# Patient Record
(demographics unavailable — no encounter records)

---

## 2025-06-26 NOTE — HISTORY OF PRESENT ILLNESS
[Mother] : mother [Fruit] : fruit [Vegetables] : vegetables [Meat] : meat [Eggs] : eggs [Fish] : fish [Normal] : Normal [Yes] : Patient goes to dentist yearly [Tap water] : Primary Fluoride Source: Tap water [Grade ___] : Grade [unfilled] [Adequate performance] : Adequate performance [No] : No cigarette smoke exposure [Water heater temperature set at <120 degrees F] : Water heater temperature set at <120 degrees F [Car seat in back seat] : Car seat in back seat [Carbon Monoxide Detectors] : Carbon monoxide detectors [Smoke Detectors] : Smoke detectors [Supervised outdoor play] : Supervised outdoor play

## 2025-06-26 NOTE — DEVELOPMENTAL MILESTONES
[Starts/continues conversation with peers] : starts/continues conversation with peers [Hops on one foot 3 to 4 times] : hops on one foot 3 to 4 times [Writes first and last name in] : writes first and last name in uppercase or lowercase letters

## 2025-06-27 NOTE — DISCUSSION/SUMMARY
[Normal Growth] : growth [Normal Development] : development [None] : No known medical problems [No Elimination Concerns] : elimination [No Feeding Concerns] : feeding [No Skin Concerns] : skin [Normal Sleep Pattern] : sleep [School Readiness] : school readiness [Mental Health] : mental health [Nutrition and Physical Activity] : nutrition and physical activity [Oral Health] : oral health [Safety] : safety [No Medications] : ~He/She~ is not on any medications [Mother] : mother [Full Activity without restrictions including Physical Education & Athletics] : Full Activity without restrictions including Physical Education & Athletics [FreeTextEntry1] : Continue balanced diet with all food groups. Brush teeth twice a day with toothbrush. Recommend visit to dentist. Help child to maintain consistent daily routines and sleep schedule. School discussed. Ensure home is safe. Teach child about personal safety. Use consistent, positive discipline. Limit screen time to no more than 2 hours per day. Encourage physical activity. Child needs to ride in a belt-positioning booster seat until  4 feet 9 inches has been reached and are between 8 and 12 years of age. The patient should participate in 60 minutes or more of physical activity a day. Encourage structured physical activity when possible (ie, participation in team or individual sports, or supervised exercise sessions). The patient would be more likely to participate consistently in these activities because they would be accountable to a  or leader. The patient may engage in a gym or fitness center if possible. Educational material relating to physical activity was provided to the patient.